# Patient Record
Sex: FEMALE | Race: WHITE | Employment: FULL TIME | URBAN - METROPOLITAN AREA
[De-identification: names, ages, dates, MRNs, and addresses within clinical notes are randomized per-mention and may not be internally consistent; named-entity substitution may affect disease eponyms.]

---

## 2016-10-03 LAB
ANTIBODY SCREEN, EXTERNAL: NEGATIVE
HBSAG, EXTERNAL: NEGATIVE
HIV, EXTERNAL: NEGATIVE
RUBELLA, EXTERNAL: NORMAL
TYPE, ABO & RH, EXTERNAL: NORMAL

## 2017-02-20 LAB — T. PALLIDUM, EXTERNAL: NEGATIVE

## 2017-04-14 LAB — GRBS, EXTERNAL: NEGATIVE

## 2017-04-27 ENCOUNTER — HOSPITAL ENCOUNTER (INPATIENT)
Age: 37
LOS: 2 days | Discharge: HOME | End: 2017-04-29
Attending: OBSTETRICS & GYNECOLOGY | Admitting: OBSTETRICS & GYNECOLOGY
Payer: COMMERCIAL

## 2017-04-27 LAB
BASOPHILS # BLD AUTO: 0 K/UL (ref 0–0.1)
BASOPHILS # BLD: 0 % (ref 0–1)
EOSINOPHIL # BLD: 0 K/UL (ref 0–0.4)
EOSINOPHIL NFR BLD: 0 % (ref 0–7)
ERYTHROCYTE [DISTWIDTH] IN BLOOD BY AUTOMATED COUNT: 14 % (ref 11.5–14.5)
HCT VFR BLD AUTO: 38.9 % (ref 35–47)
HGB BLD-MCNC: 13.4 G/DL (ref 11.5–16)
LYMPHOCYTES # BLD AUTO: 9 % (ref 12–49)
LYMPHOCYTES # BLD: 1.6 K/UL (ref 0.8–3.5)
MCH RBC QN AUTO: 31.5 PG (ref 26–34)
MCHC RBC AUTO-ENTMCNC: 34.4 G/DL (ref 30–36.5)
MCV RBC AUTO: 91.5 FL (ref 80–99)
MONOCYTES # BLD: 1.4 K/UL (ref 0–1)
MONOCYTES NFR BLD AUTO: 8 % (ref 5–13)
NEUTS SEG # BLD: 15.5 K/UL (ref 1.8–8)
NEUTS SEG NFR BLD AUTO: 83 % (ref 32–75)
PLATELET # BLD AUTO: 272 K/UL (ref 150–400)
RBC # BLD AUTO: 4.25 M/UL (ref 3.8–5.2)
WBC # BLD AUTO: 18.5 K/UL (ref 3.6–11)

## 2017-04-27 PROCEDURE — 0HQ9XZZ REPAIR PERINEUM SKIN, EXTERNAL APPROACH: ICD-10-PCS | Performed by: OBSTETRICS & GYNECOLOGY

## 2017-04-27 PROCEDURE — 99282 EMERGENCY DEPT VISIT SF MDM: CPT

## 2017-04-27 PROCEDURE — 85025 COMPLETE CBC W/AUTO DIFF WBC: CPT | Performed by: OBSTETRICS & GYNECOLOGY

## 2017-04-27 PROCEDURE — 77030002888 HC SUT CHRMC J&J -A

## 2017-04-27 PROCEDURE — 74011250637 HC RX REV CODE- 250/637: Performed by: OBSTETRICS & GYNECOLOGY

## 2017-04-27 PROCEDURE — 75410000000 HC DELIVERY VAGINAL/SINGLE

## 2017-04-27 PROCEDURE — 65410000002 HC RM PRIVATE OB

## 2017-04-27 PROCEDURE — 75410000002 HC LABOR FEE PER 1 HR

## 2017-04-27 PROCEDURE — 36415 COLL VENOUS BLD VENIPUNCTURE: CPT | Performed by: OBSTETRICS & GYNECOLOGY

## 2017-04-27 PROCEDURE — 75410000003 HC RECOV DEL/VAG/CSECN EA 0.5 HR

## 2017-04-27 RX ORDER — LANOLIN ALCOHOL/MO/W.PET/CERES
CREAM (GRAM) TOPICAL
COMMUNITY

## 2017-04-27 RX ORDER — ACETAMINOPHEN 325 MG/1
650 TABLET ORAL
Status: DISCONTINUED | OUTPATIENT
Start: 2017-04-27 | End: 2017-04-29 | Stop reason: HOSPADM

## 2017-04-27 RX ORDER — HYDROCORTISONE ACETATE PRAMOXINE HCL 2.5; 1 G/100G; G/100G
CREAM TOPICAL AS NEEDED
Status: DISCONTINUED | OUTPATIENT
Start: 2017-04-27 | End: 2017-04-29 | Stop reason: HOSPADM

## 2017-04-27 RX ORDER — NALOXONE HYDROCHLORIDE 0.4 MG/ML
0.4 INJECTION, SOLUTION INTRAMUSCULAR; INTRAVENOUS; SUBCUTANEOUS AS NEEDED
Status: DISCONTINUED | OUTPATIENT
Start: 2017-04-27 | End: 2017-04-29 | Stop reason: HOSPADM

## 2017-04-27 RX ORDER — IBUPROFEN 400 MG/1
800 TABLET ORAL EVERY 8 HOURS
Status: DISCONTINUED | OUTPATIENT
Start: 2017-04-27 | End: 2017-04-29 | Stop reason: HOSPADM

## 2017-04-27 RX ORDER — ACETAMINOPHEN 325 MG/1
650 TABLET ORAL
COMMUNITY

## 2017-04-27 RX ORDER — NALOXONE HYDROCHLORIDE 0.4 MG/ML
0.4 INJECTION, SOLUTION INTRAMUSCULAR; INTRAVENOUS; SUBCUTANEOUS AS NEEDED
Status: DISCONTINUED | OUTPATIENT
Start: 2017-04-27 | End: 2017-04-27 | Stop reason: HOSPADM

## 2017-04-27 RX ORDER — OXYCODONE AND ACETAMINOPHEN 5; 325 MG/1; MG/1
1 TABLET ORAL
Status: DISCONTINUED | OUTPATIENT
Start: 2017-04-27 | End: 2017-04-29 | Stop reason: HOSPADM

## 2017-04-27 RX ORDER — OXYTOCIN/RINGER'S LACTATE 20/1000 ML
PLASTIC BAG, INJECTION (ML) INTRAVENOUS
Status: DISCONTINUED
Start: 2017-04-27 | End: 2017-04-27

## 2017-04-27 RX ORDER — SODIUM CHLORIDE 0.9 % (FLUSH) 0.9 %
5-10 SYRINGE (ML) INJECTION EVERY 8 HOURS
Status: DISCONTINUED | OUTPATIENT
Start: 2017-04-27 | End: 2017-04-29 | Stop reason: HOSPADM

## 2017-04-27 RX ORDER — SODIUM CHLORIDE 0.9 % (FLUSH) 0.9 %
5-10 SYRINGE (ML) INJECTION AS NEEDED
Status: DISCONTINUED | OUTPATIENT
Start: 2017-04-27 | End: 2017-04-29 | Stop reason: HOSPADM

## 2017-04-27 RX ORDER — SODIUM CHLORIDE, SODIUM LACTATE, POTASSIUM CHLORIDE, CALCIUM CHLORIDE 600; 310; 30; 20 MG/100ML; MG/100ML; MG/100ML; MG/100ML
125 INJECTION, SOLUTION INTRAVENOUS CONTINUOUS
Status: DISCONTINUED | OUTPATIENT
Start: 2017-04-27 | End: 2017-04-29 | Stop reason: HOSPADM

## 2017-04-27 RX ORDER — OXYTOCIN/RINGER'S LACTATE 20/1000 ML
125-500 PLASTIC BAG, INJECTION (ML) INTRAVENOUS ONCE
Status: ACTIVE | OUTPATIENT
Start: 2017-04-27 | End: 2017-04-28

## 2017-04-27 RX ADMIN — IBUPROFEN 800 MG: 400 TABLET, FILM COATED ORAL at 22:17

## 2017-04-27 NOTE — L&D DELIVERY NOTE
Delivery Summary  Patient: Gabriela Doran             Circumcision:   NA-female  Additional Delivery Comments - Patietn arrived to L&D fully dilated with urge to push   Uncomplicated   Vigorous infant at birth   Spontaneous placenta. 1st degree perineal laceration   Local anesthetic given   Repaired anatomically with 2.0 chromic  Tolerated well  Hemostatic post repair   \"Cade\"      Information for the patient's :  Jacinda miller [[id number]]       Labor Events:    Labor: No   Rupture Date: 2017   Rupture Time: 4:30 PM   Rupture Type SROM   Amniotic Fluid Volume:      Amniotic Fluid Description: Clear None   Induction: None       Augmentation: None   Labor Events: None     Cervical Ripening:     None     Delivery Events:  Episiotomy: None   Laceration(s): First degree perineal     Repaired: Yes    Number of Repair Packets: 1   Suture Type and Size: Chromic 2-0     Estimated Blood Loss (ml): 150ml       Delivery Date: 2017    Delivery Time: 6:52 PM  Delivery Type: Vaginal, Spontaneous Delivery  Sex:  Female     Gestational Age: <None>   Delivery Clinician:  Despina Heimlich Miklavcic  Living Status: Yes   Delivery Location: L&D            APGARS  One minute Five minutes Ten minutes   Skin color: 1   1        Heart rate: 2   2        Grimace: 2   2        Muscle tone: 2   2        Breathin   2        Totals: 9   9            Presentation: Vertex    Position:   Occiput Anterior  Resuscitation Method:  Suctioning-bulb; Tactile Stimulation     Meconium Stained: None      Cord Vessels: 3 Vessels      Cord Events:    Cord Blood Sent?:  No    Blood Gases Sent?:  No    Placenta:  Date/Time:   7:05 PM  Removal: Spontaneous      Appearance: Intact      Measurements:  Birth Weight: 3.475 kg      Birth Length: 49.5 cm      Head Circumference: 33.5 cm      Chest Circumference: 35 cm     Abdominal Girth: 32.5 cm    Other Providers:   SAGAR NG;BARI JOHNSTON;CARMEN BUSBY Andres Reyes, Obstetrician;Primary Nurse;Primary  Nurse;Nursery Nurse;Scrub Tech           Cord pH:  none    Episiotomy: None   Laceration(s): First degree perineal      Estimated Blood Loss (ml): 150    Labor Events  Method: None       Augmentation: None   Cervical Ripening:       None        Operative Vaginal Delivery - none    Group B Strep: No results found for: GRBSEXT, GRBSEXT  Information for the patient's :  Min miller [[id number]]   No results found for: ABORH, PCTABR, PCTDIG, BILI, ABORHEXT, ABORH    No results found for: APH, APCO2, APO2, AHCO3, ABEC, ABDC, O2ST, EPHV, PCO2V, PO2V, HCO3V, EBEV, EBDV, SITE, RSCOM

## 2017-04-27 NOTE — IP AVS SNAPSHOT
Current Discharge Medication List  
  
CONTINUE these medications which have NOT CHANGED Dose & Instructions Dispensing Information Comments Morning Noon Evening Bedtime Iron 325 mg (65 mg iron) tablet Generic drug:  ferrous sulfate Your last dose was: Your next dose is: Take  by mouth Daily (before breakfast). Refills:  0  
     
   
   
   
  
 PNV Combo No.47-Iron-FA #1-DHA 27-1-300 mg Cap Your last dose was: Your next dose is: Take  by mouth. Refills:  0  
     
   
   
   
  
 TYLENOL 325 mg tablet Generic drug:  acetaminophen Your last dose was: Your next dose is:    
   
   
 Dose:  650 mg Take 650 mg by mouth every four (4) hours as needed for Pain. Refills:  0

## 2017-04-27 NOTE — PROGRESS NOTES
: Pt arrived ambulatory to unit c/o contractions since 11am, pt was seen in office today at 1500 was 1cm, pt states some fluid leaking out today can't recall what time.  Pt appears very uncomfortable, performed SVE pt 10 cm, called Dr. Kaitlin Chris to bedside for delivery    : Dr. Kaitlin Chris at bedside, arrived for delivery    :  of live female infant

## 2017-04-27 NOTE — IP AVS SNAPSHOT
Summary of Care Report The Summary of Care report has been created to help improve care coordination. Users with access to [employer] or [employer] [facility] (Web-based application) may access additional patient information including the Discharge Summary. If you are not currently a [address] user and need more information, please call the number listed below in the [place] [address] section and ask to be connected with Medical Records. Facility Information Name Address Phone [address] [address] [phone] Patient Information Patient Name Sex MIRTHA Darling ([id number]) Female 1980 Discharge Information Admitting Provider Service Area Unit Erika Solis MD / [phone] [address] Mother Infant / [phone] Discharge Provider Discharge Date/Time Discharge Disposition Destination (none) 2017 (Pending) AHR (none) Patient Language Language ENGLISH [13] Hospital Problems as of 2017  Never Reviewed Class Noted - Resolved Last Modified POA Active Problems Labor and delivery, indication for care  2017 - Present 2017 by Erika Solis MD Unknown Entered by Erika Solis MD  
  
You are allergic to the following No active allergies Current Discharge Medication List  
  
CONTINUE these medications which have NOT CHANGED Dose & Instructions Dispensing Information Comments Iron 325 mg (65 mg iron) tablet Generic drug:  ferrous sulfate Take  by mouth Daily (before breakfast). Refills:  0  
   
 PNV Combo No.47-Iron-FA #1-DHA 27-1-300 mg Cap Take  by mouth. Refills:  0  
   
 TYLENOL 325 mg tablet Generic drug:  acetaminophen Dose:  650 mg Take 650 mg by mouth every four (4) hours as needed for Pain. Refills:  0 Follow-up Information Follow up With Details Comments Contact Info Provider Unknown   Patient not available to ask Ty Loera MD In 6 weeks  [address] [facility] 
[phone] Discharge Instructions POST DELIVERY DISCHARGE INSTRUCTIONS Name: Stella Sommers YOB: 1980 Primary Diagnosis: Active Problems: 
  Labor and delivery, indication for care (4/27/2017) General:  
 
Diet/Diet Restrictions: 
· Eight 8-ounce glasses of fluid daily (water, juices); avoid excessive caffeine intake. · Meals/snacks as desired which are high in fiber and carbohydrates and low in fat and cholesterol. Medications:  
{Medication reconciliation information is now added to the patient's AVS automatically when it is printed. There is no need to use this SmartLink in discharge instructions. Highlight this text and delete it to clear this message} Physical Activity / Restrictions / Safety: · Avoid heavy lifting, no more that 8 lbs. For 2-3 weeks;  
· Limit use of stairs to 2 times daily for the first week home. · No driving for one week. · Avoid intercourse 4-6 weeks, no douching or tampon use. · Check with obstetrician before starting or resuming an exercise program.   
 
Discharge Instructions/Special Treatment/Home Care Needs:  
 
· Continue prenatal vitamins. · Continue to use squirt bottle with warm water on your episiotomy after each bathroom use until bleeding stops. · If steri-strips applied to your incision, remove in 7-10 days. Call your doctor for the following: · Fever over 101 degrees by mouth. · Vaginal bleeding heavier than a normal menstrual period or clots larger than a golf ball. · Red streaks or increased swelling of legs, painful red streaks on your breast. 
· Painful urination, constipation and increased pain or swelling or discharge with your incision. · If you feel extremely anxious or overwhelmed. · If you have thoughts of harming yourself and/or your baby. Pain Management:  
 
· Take Acetaminophen (Tylenol) or Ibuprofen (Advil, Motrin), as directed for pain. · Use a warm Sitz bath 3 times daily to relieve episiotomy or hemorrhoidal discomfort. · For hemorrhoidal discomfort, use Tucks and Anusol cream as needed and directed. · Heating pad to  incision as needed. Follow-Up Care:  
 
Appointment with MD: Follow-up Appointments Procedures  FOLLOW UP VISIT Appointment in: 6 Weeks Standing Status:   Standing Number of Occurrences:   1 Order Specific Question:   Appointment in Answer:   6 Weeks Telephone number: [phone] Signed By: Kirsten Arboleda MD                                                                                                   Date: 2017 Time: 8:51 AM 
 
 
 
Datto Activation Thank you for enrolling in [facility]. Please follow the instructions below to securely access your online medical record. Datto allows you to send messages to your doctor, view your test results, renew your prescriptions, schedule appointments, and more. How Do I Sign Up? 1. In your internet browser, go to [email or web address]. 2. Click on the First Time User? Click Here link in the Sign In box. You will see the New Member Sign Up page. 3. Enter your Datto Access Code exactly as it appears below. You will not need to use this code after youve completed the sign-up process. If you do not sign up before the expiration date, you must request a new code. Datto Access Code: [email or web address] Expires: 2017  5:51 PM  
 
4. Enter the last four digits of your Social Security Number (xxxx) and Date of Birth (mm/dd/yyyy) as indicated and click Submit. You will be taken to the next sign-up page. 5. Create a Datto ID.  This will be your Datto login ID and cannot be changed, so think of one that is secure and easy to remember. 6. Create a Greystripe password. You can change your password at any time. 7. Enter your Password Reset Question and Answer. This can be used at a later time if you forget your password. 8. Enter your e-mail address. You will receive e-mail notification when new information is available in [address] 9. Click Sign Up. You can now view your medical record. Additional Information Remember, Greystripe is NOT to be used for urgent needs. For medical emergencies, dial 911. Now available from your iPhone and Android! Chart Review Routing History Recipient Method Report Sent By Jasper Muro Provider Unknown, MD  
Patient not available to ask [address] Mail IP Auto Routed Notes MD Emir Blackwell 4/29/2017  8:51 AM 04/29/2017

## 2017-04-27 NOTE — IP AVS SNAPSHOT
[address] [facility]. 
[phone] Patient: Teresa Meyers MRN: [id number] GES:[birth year] You are allergic to the following No active allergies Recent Documentation Breastfeeding? OB Status Unknown Recent pregnancy Unresulted Labs Order Current Status SAMPLE TO BLOOD BANK In process Emergency Contacts Name Discharge Info Relation Home Work Mobile Samy Jackson  Spouse [3] [phone] About your hospitalization You were admitted on:  April 27, 2017 You last received care in the:  [facility] 3 MOTHER INFANT You were discharged on:  April 29, 2017 Unit phone number:  [phone] Why you were hospitalized Your primary diagnosis was:  Not on File Your diagnoses also included:  Labor And Delivery, Indication For Care Providers Seen During Your Hospitalizations Provider Role Specialty Primary office phone Jeremiah Zapata MD Attending Provider Obstetrics & Gynecology [phone] Your Primary Care Physician (PCP) Primary Care Physician Office Phone Office Fax UNKNOWN, PROVIDER ** None ** ** None ** Follow-up Information Follow up With Details Comments Contact Info Provider Unknown   Patient not available to ask Jeremiah Zapata MD In 6 weeks  [address] [facility]. [phone] Current Discharge Medication List  
  
CONTINUE these medications which have NOT CHANGED Dose & Instructions Dispensing Information Comments Morning Noon Evening Bedtime Iron 325 mg (65 mg iron) tablet Generic drug:  ferrous sulfate Your last dose was: Your next dose is: Take  by mouth Daily (before breakfast). Refills:  0  
     
   
   
   
  
 PNV Combo No.47-Iron-FA #1-DHA 27-1-300 mg Cap Your last dose was: Your next dose is: Take  by mouth. Refills:  0  
     
   
   
   
  
 TYLENOL 325 mg tablet Generic drug:  acetaminophen Your last dose was: Your next dose is:    
   
   
 Dose:  650 mg Take 650 mg by mouth every four (4) hours as needed for Pain. Refills:  0 Discharge Instructions POST DELIVERY DISCHARGE INSTRUCTIONS Name: Sharmin Briones YOB: 1980 Primary Diagnosis: Active Problems: 
  Labor and delivery, indication for care (4/27/2017) General:  
 
Diet/Diet Restrictions: 
· Eight 8-ounce glasses of fluid daily (water, juices); avoid excessive caffeine intake. · Meals/snacks as desired which are high in fiber and carbohydrates and low in fat and cholesterol. Medications:  
{Medication reconciliation information is now added to the patient's AVS automatically when it is printed. There is no need to use this SmartLink in discharge instructions. Highlight this text and delete it to clear this message} Physical Activity / Restrictions / Safety: · Avoid heavy lifting, no more that 8 lbs. For 2-3 weeks;  
· Limit use of stairs to 2 times daily for the first week home. · No driving for one week. · Avoid intercourse 4-6 weeks, no douching or tampon use. · Check with obstetrician before starting or resuming an exercise program.   
 
Discharge Instructions/Special Treatment/Home Care Needs:  
 
· Continue prenatal vitamins. · Continue to use squirt bottle with warm water on your episiotomy after each bathroom use until bleeding stops. · If steri-strips applied to your incision, remove in 7-10 days. Call your doctor for the following: · Fever over 101 degrees by mouth. · Vaginal bleeding heavier than a normal menstrual period or clots larger than a golf ball.  
· Red streaks or increased swelling of legs, painful red streaks on your breast. 
 · Painful urination, constipation and increased pain or swelling or discharge with your incision. · If you feel extremely anxious or overwhelmed. · If you have thoughts of harming yourself and/or your baby. Pain Management:  
 
· Take Acetaminophen (Tylenol) or Ibuprofen (Advil, Motrin), as directed for pain. · Use a warm Sitz bath 3 times daily to relieve episiotomy or hemorrhoidal discomfort. · For hemorrhoidal discomfort, use Tucks and Anusol cream as needed and directed. · Heating pad to  incision as needed. Follow-Up Care:  
 
Appointment with MD: Follow-up Appointments Procedures  FOLLOW UP VISIT Appointment in: 6 Weeks Standing Status:   Standing Number of Occurrences:   1 Order Specific Question:   Appointment in Answer:   6 Weeks Telephone number: [phone] Signed By: Jarad Valdivia MD                                                                                                   Date: 2017 Time: 8:51 AM 
 
 
 
Lelonghart Activation Thank you for enrolling in [address] Please follow the instructions below to securely access your online medical record. [facility] allows you to send messages to your doctor, view your test results, renew your prescriptions, schedule appointments, and more. How Do I Sign Up? 1. In your internet browser, go to [email or web address]. 2. Click on the First Time User? Click Here link in the Sign In box. You will see the New Member Sign Up page. 3. Enter your [facility] Access Code exactly as it appears below. You will not need to use this code after youve completed the sign-up process. If you do not sign up before the expiration date, you must request a new code. [facility] Access Code: [email or web address] Expires: 2017  5:51 PM  
 
4. Enter the last four digits of your Social Security Number (xxxx) and Date of Birth (mm/dd/yyyy) as indicated and click Submit.  You will be taken to the next sign-up page. 5. Create a Pain Doctor ID. This will be your Pain Doctor login ID and cannot be changed, so think of one that is secure and easy to remember. 6. Create a [facility] Doctor password. You can change your password at any time. 7. Enter your Password Reset Question and Answer. This can be used at a later time if you forget your password. 8. Enter your e-mail address. You will receive e-mail notification when new information is available in [address] 9. Click Sign Up. You can now view your medical record. Additional Information Remember, Pain Doctor is NOT to be used for urgent needs. For medical emergencies, dial 911. Now available from your iPhone and Android! Discharge Orders None Pain Doctor Announcement We are excited to announce that we are making your provider's discharge notes available to you in Pain Doctor. You will see these notes when they are completed and signed by the physician that discharged you from your recent hospital stay. If you have any questions or concerns about any information you see in Pain Doctor, please call the Health Information Department where you were seen or reach out to your Primary Care Provider for more information about your plan of care. Introducing [facility]! Crista Mendes introduces Pain Doctor patient portal. Now you can access parts of your medical record, email your doctor's office, and request medication refills online. 1. In your internet browser, go to [email or web address] 2. Click on the First Time User? Click Here link in the Sign In box. You will see the New Member Sign Up page. 3. Enter your [facility] Doctor Access Code exactly as it appears below. You will not need to use this code after youve completed the sign-up process. If you do not sign up before the expiration date, you must request a new code. · [facility] Access Code: [email or web address] Expires: 7/26/2017  5:51 PM 
 
 4. Enter the last four digits of your Social Security Number (xxxx) and Date of Birth (mm/dd/yyyy) as indicated and click Submit. You will be taken to the next sign-up page. 5. Create a MyVR ID. This will be your MyVR login ID and cannot be changed, so think of one that is secure and easy to remember. 6. Create a MyVR password. You can change your password at any time. 7. Enter your Password Reset Question and Answer. This can be used at a later time if you forget your password. 8. Enter your e-mail address. You will receive e-mail notification when new information is available in [address] 9. Click Sign Up. You can now view and download portions of your medical record. 10. Click the Download Summary menu link to download a portable copy of your medical information. If you have questions, please visit the Frequently Asked Questions section of the MyVR website. Remember, MyVR is NOT to be used for urgent needs. For medical emergencies, dial 911. Now available from your iPhone and Android! General Information Please provide this summary of care documentation to your next provider. Patient Signature:  ____________________________________________________________ Date:  ____________________________________________________________  
  
Nancy Brought Provider Signature:  ____________________________________________________________ Date:  ____________________________________________________________

## 2017-04-28 PROCEDURE — 65410000002 HC RM PRIVATE OB

## 2017-04-28 PROCEDURE — 74011250637 HC RX REV CODE- 250/637: Performed by: OBSTETRICS & GYNECOLOGY

## 2017-04-28 RX ADMIN — IBUPROFEN 800 MG: 400 TABLET, FILM COATED ORAL at 14:32

## 2017-04-28 RX ADMIN — IBUPROFEN 800 MG: 400 TABLET, FILM COATED ORAL at 05:59

## 2017-04-28 RX ADMIN — IBUPROFEN 800 MG: 400 TABLET, FILM COATED ORAL at 22:04

## 2017-04-28 NOTE — H&P
EDC:2017  EGA: 38 weeks, 3 days      Primary Provider:  Noe Logan MD    CC:  labor. History of Present Illness:  present sin active labor, fully dilated with urge to push   Precip delivery on arrival   GBS neg   See delivery note for full details       Patient's Prenatal Care with Doctor of Record Noe Logan MD Notable For -    Back pain in pregnancy  Advanced paternal age   lab screening  High risk pregnancy AMA primigravida          Impression & Recommendations:    Problem # 1:  High risk pregnancy AMA primigravida (ICD-V23.81) (FAQ96-B84.513)  admit in active labor   precip delivery on arrival   Uncomplicated    see delivery note for full details         Past Pregnancy History      :  1     Term Births:  0     Premature Births: 0     Living Children: 0     Para:   0     Mult. Births:  0     Prev : 0     Aborta:  0     Elect. Ab:  0     Spont. Ab:  0     Ectopics:  0    Pregnancy # 1     Comments:  current        Past Medical History:     Reviewed history from 2016 and no changes required:        labile hypertension- never taken medication        ?  outbreak of HSV in college- neg HSV 2 Abs 2016        Abnormal Pap Smear s/p cryo     Past Surgical History:     Reviewed history from 2016 and no changes required:        wisdom tooth removed    Family History Summary:      Reviewed history Last on 2017 and no changes required:2017  Father (Elsa Patel) - Has Family History of Hypertension - Entered On: 2016  Mother Darleen Barrios.) - Has Family History of Hypertension - Entered On: 2016  Aunt - Has Family History of Heart Disease - Heart Attack;  - Entered On: 2016  Other family member - Has Family History of Breast Cancer - MGA - Entered On: 2016    General Comments - FH:  Family history transferred to [address] [place] [address] And [address] [facility]     Social History:     Reviewed history from 10/03/2016 and no changes required:               Previous Tobacco Use: Signed On - 11/03/2016  Smoked Tobacco Use:  Never smoker  Smokeless Tobacco Use:  Never  Passive smoke exposure:  no  Drug use:  no  HIV high-risk behavior:  no  Caffeine use:  1 drinks per day    Previous Alcohol Use: Signed On - 11/03/2016  Alcohol use:  no  Exercise:  yes     Times per week:  3-6     Type of Exercise:  runj, weights  Seatbelt use:  preg- %    PAP Smear History:     Date of Last PAP Smear:  10/03/2016      Review of Systems        See HPI    Except as noted in the HPI, the review of systems is negative for General and .     Allergies      Medications Removed from Medication List        Flowsheet View for Follow-up Visit     Estimated weeks of        gestation:  38 3/7     FHR:   135     Fetal position:  vertex     Cx Dilation:  10cm     Cx Effacement: 100%     Cx Station:  +2      Vital Signs      FHT Descrip:    reactive       - Additional FHT Comments: category 1 fetal tracing   Contractions:  yes  UC Frequency:  q 3 min    NST:   reactive         Physical Exam     General           General appearance:  uncomfrotable with contractions     Head           Inspection:   normal    Eyes           External:   EOM intact    ENT           Dental:   adequate dentition    Chest           Lungs:  clear to auscultation          Heart:  regular rate and rhythm    Extremeties           Extremeties:  0 edema    Neurological           Reflexes:  2+ and symmetric with no pathological reflexes    Psych           Orientation:  oriented to time, place, and person          Mood:  no appearance of anxiety, depression, or agitation    Lymph           Inguinal:  no inguinal adenopathy    Skin           Inspection:  no rashes, suspicious lesions, or ulcerations    Abdomen           Abdomen:  gravid    Pelvic Exam           EGBUS:  no lesions          Vagina:  normal appearing without lesions or discharge          Uterus:  gravid          Cervix:  no lesions or discharge                  Dilation: : 10cm                  Effacement:  100%                  Station:  +2                  Presentation:  vertex                  Membranes:  ruptured, clear            Impression & Recommendations:    Problem # 1:  High risk pregnancy AMA primigravida (ICD-V23.81) (UHF74-Z46.513)  admit in active labor   precip delivery on arrival   Uncomplicated    see delivery note for full details         Medications (at conclusion of this visit)    2017 IRON CR-TABS (FERROUS SULFATE CR-TABS) Prescribed by non 606/706 Silvia Bejarano MD  2017 BREAST PUMP MISC (MISC. DEVICES) double electric breat pump diagnosis normal breast feeding  10/03/2016 PRENATAL VITAMIN TABS (PRENATAL VIT-FE FUMARATE-FA TABS)           LABORATORY DATA   TEST DATE RESULT   Group B Strep culture 2017 Negative                                   (Group B Strep Culture Result Field)   Blood Type 10/03/2016 O                                             (Blood Type Result Field)   Rh 10/03/2016 Positive                                   (Rh Result Field)   Rhogam Inj Given     Tdap Vaccine Given 2017 Vacc.  [address]   Antibody Screen 2017 Negative   Rubella  [facility] Reference Ranges On or After 3/10/14                  <0.90              Non-immune      0.90 - 0.99     Equivocal      >0.99              Immune    [facility] Reference Ranges  Before 3/10/14           <5                 Non-immune             5 - 9               Equivocal            >9                 Immune  [facility] Reference Ranges       < Or = 0.90       Negative             0.91-1.09          Equivocal            > Or = 1.10       Positive   10/03/2016     2.03     TPA (T Pallidum Antibodies) 2017 Negative   Serology (RPR)     HBsAg 10/03/2016 Negative   HIV 10/03/2016 Non Reactive   Hemoglobin 2017 11.0   Hematocrit 2017 33.1   Platelets  303 X10E3/UL   TSH     Urine Culture 10/03/2016 Negative   GC DNA Probe 10/03/2016 Negative Chlamydia DNA 10/03/2016 Negative   PAP 10/03/2016 NIL   Flu Vaccine Given 10/03/2016 accepted   HGBA1C     HGB Electro     T4, Free     BG Fasting     GTT 1H 50G 02/20/2017 99   GTT 1H 100G     GTT 2H 100G     GTT 3H 100G     Glucose Plasma     CF Accept or Decline 12/22/2016 declined   CF Screen Result 12/22/2016 Declined   Nuchal Trans 12/22/2016 4.67^4. 67 mm&millimeters   AFP Only 12/01/2016 *Screen Negative*   Tetra     AFP Serum     CVS 10/03/2016 declined   AFP Amniotic     Amnio Karyo 10/03/2016 declined   FISH     GC Culture     Chlamydia Cult     Ureaplasma     Mycoplasma     WBC 10/03/2016 7.1 X10E3/UL   RBC 10/03/2016 4.06 X10E6/UL   MCV 10/03/2016 91   MCH 10/03/2016 31.0   MCHC RBC 10/03/2016 34.2     ULTRASOUND DATA   TEST DATE RESULT   Estimated Fetal Weight 02/20/2017 5430^1731 g&grams                                     Weight % 02/20/2017 94^94% %&percent                                                HELLEN 02/20/2017 37.23^18.2 cm&centimeters                    BPP 02/20/2017 8^8 [n/a]&Not applicable   Cervical Length (mm)             Electronically signed by Rojas Feldman MD on 04/27/2017 at 8:48 PM    ________________________________________________________________________

## 2017-04-28 NOTE — ROUTINE PROCESS
Bedside and Verbal shift change report given to ZA Sandoval RN (oncoming nurse) by JENNIFER Gilman RN (offgoing nurse). Report included the following information SBAR.

## 2017-04-28 NOTE — PROGRESS NOTES
Post-Partum Day Number 1 Progress Note    Baljitvenus Manuel     Assessment: Doing well, post partum day 1    Plan:  1. Continue routine postpartum and perineal care as well as maternal education. 2. N/A     Information for the patient's :  Martita miller [[id number]]   Vaginal, Spontaneous Delivery   Patient doing well without significant complaint. Voiding without difficulty, normal lochia. Vitals:  Visit Vitals    /86 (BP 1 Location: Right arm, BP Patient Position: Sitting)    Pulse 70    Temp 98.7 °F (37.1 °C)    Resp 16    SpO2 97%    Breastfeeding Unknown     Temp (24hrs), Av.5 °F (36.9 °C), Min:98.1 °F (36.7 °C), Max:98.7 °F (37.1 °C)        Exam:   Patient without distress. Abdomen soft, fundus firm, nontender                Perineum with normal lochia noted. Lower extremities are negative for swelling, cords or tenderness. Labs:     Lab Results   Component Value Date/Time    WBC 18.5 2017 06:44 PM    HGB 13.4 2017 06:44 PM    HCT 38.9 2017 06:44 PM    PLATELET 092  06:44 PM       Recent Results (from the past 24 hour(s))   CBC WITH AUTOMATED DIFF    Collection Time: 17  6:44 PM   Result Value Ref Range    WBC 18.5 (H) 3.6 - 11.0 K/uL    RBC 4.25 3.80 - 5.20 M/uL    HGB 13.4 11.5 - 16.0 g/dL    HCT 38.9 35.0 - 47.0 %    MCV 91.5 80.0 - 99.0 FL    MCH 31.5 26.0 - 34.0 PG    MCHC 34.4 30.0 - 36.5 g/dL    RDW 14.0 11.5 - 14.5 %    PLATELET 432 466 - 137 K/uL    NEUTROPHILS 83 (H) 32 - 75 %    LYMPHOCYTES 9 (L) 12 - 49 %    MONOCYTES 8 5 - 13 %    EOSINOPHILS 0 0 - 7 %    BASOPHILS 0 0 - 1 %    ABS. NEUTROPHILS 15.5 (H) 1.8 - 8.0 K/UL    ABS. LYMPHOCYTES 1.6 0.8 - 3.5 K/UL    ABS. MONOCYTES 1.4 (H) 0.0 - 1.0 K/UL    ABS. EOSINOPHILS 0.0 0.0 - 0.4 K/UL    ABS.  BASOPHILS 0.0 0.0 - 0.1 K/UL

## 2017-04-28 NOTE — LACTATION NOTE
This note was copied from a baby's chart. 1923 [facility] services introduced to new family. 10 hours of life, -1% wt loss. 4 baby led feeds, latch of 8 recorded. 3 wet, 1 stool   Well read and independent mother following baby led cues. Just fed infant x 20 minutes, skin to skin. Infant is relaxed and sleeping. Bedside I/0 on track, diligent parents observations and enjoying new baby. Has pump provided by insurance benefit. AWP and support group explained for 1st time mother. Call prn.

## 2017-04-28 NOTE — PROGRESS NOTES
Pt OOB for 2nd time. Voided in potty hat for second time without difficulty. Mary care performed. IV removed.

## 2017-04-28 NOTE — PROGRESS NOTES
Bedside shift change report given to JOSE GUADALUPE Bustamante RN (oncoming nurse) by ZA Ca RN. (offgoing nurse). Report included the following information SBAR.

## 2017-04-28 NOTE — ROUTINE PROCESS
Bedside and Verbal shift change report given to BERNICE Marvin (oncoming nurse) by Uzma Nj. America Hawkins RN (offgoing nurse). Report included the following information SBAR, Kardex, Procedure Summary, Intake/Output, MAR and Recent Results.

## 2017-04-28 NOTE — ROUTINE PROCESS
TRANSFER - IN REPORT:    Verbal report received from KAMILLE Sloan RN(name) on Teresa Meyers  being received from L&D(unit) for routine progression of care      Report consisted of patients Situation, Background, Assessment and   Recommendations(SBAR). Information from the following report(s) SBAR was reviewed with the receiving nurse. Opportunity for questions and clarification was provided. Assessment completed upon patients arrival to unit and care assumed.

## 2017-04-29 VITALS
DIASTOLIC BLOOD PRESSURE: 90 MMHG | OXYGEN SATURATION: 97 % | TEMPERATURE: 98.6 F | SYSTOLIC BLOOD PRESSURE: 129 MMHG | RESPIRATION RATE: 18 BRPM | HEART RATE: 65 BPM

## 2017-04-29 PROCEDURE — 74011250637 HC RX REV CODE- 250/637: Performed by: OBSTETRICS & GYNECOLOGY

## 2017-04-29 RX ADMIN — IBUPROFEN 800 MG: 400 TABLET, FILM COATED ORAL at 04:57

## 2017-04-29 NOTE — DISCHARGE INSTRUCTIONS
POST DELIVERY DISCHARGE INSTRUCTIONS    Name: Bobbi Morales  YOB: 1980  Primary Diagnosis: Active Problems:    Labor and delivery, indication for care (4/27/2017)        General:     Diet/Diet Restrictions:  · Eight 8-ounce glasses of fluid daily (water, juices); avoid excessive caffeine intake. · Meals/snacks as desired which are high in fiber and carbohydrates and low in fat and cholesterol. Medications:   {Medication reconciliation information is now added to the patient's AVS automatically when it is printed. There is no need to use this SmartLink in discharge instructions. Highlight this text and delete it to clear this message}      Physical Activity / Restrictions / Safety:     · Avoid heavy lifting, no more that 8 lbs. For 2-3 weeks;   · Limit use of stairs to 2 times daily for the first week home. · No driving for one week. · Avoid intercourse 4-6 weeks, no douching or tampon use. · Check with obstetrician before starting or resuming an exercise program.      Discharge Instructions/Special Treatment/Home Care Needs:     · Continue prenatal vitamins. · Continue to use squirt bottle with warm water on your episiotomy after each bathroom use until bleeding stops. · If steri-strips applied to your incision, remove in 7-10 days. Call your doctor for the following:     · Fever over 101 degrees by mouth. · Vaginal bleeding heavier than a normal menstrual period or clots larger than a golf ball. · Red streaks or increased swelling of legs, painful red streaks on your breast.  · Painful urination, constipation and increased pain or swelling or discharge with your incision. · If you feel extremely anxious or overwhelmed. · If you have thoughts of harming yourself and/or your baby. Pain Management:     · Take Acetaminophen (Tylenol) or Ibuprofen (Advil, Motrin), as directed for pain. · Use a warm Sitz bath 3 times daily to relieve episiotomy or hemorrhoidal discomfort.    · For hemorrhoidal discomfort, use Tucks and Anusol cream as needed and directed. · Heating pad to  incision as needed. Follow-Up Care:     Appointment with MD:   Follow-up Appointments   Procedures    FOLLOW UP VISIT Appointment in: 6 Weeks     Standing Status:   Standing     Number of Occurrences:   1     Order Specific Question:   Appointment in     Answer:   Katarina Velazco     Telephone number: [phone]    Signed By: Ross Blandon MD                                                                                                   Date: 2017 Time: 8:51 AM        MyChart Activation    Thank you for enrolling in [facility]. Please follow the instructions below to securely access your online medical record. [employer] allows you to send messages to your doctor, view your test results, renew your prescriptions, schedule appointments, and more. How Do I Sign Up? 1. In your internet browser, go to [email or web address]. 2. Click on the First Time User? Click Here link in the Sign In box. You will see the New Member Sign Up page. 3. Enter your [employer] Access Code exactly as it appears below. You will not need to use this code after youve completed the sign-up process. If you do not sign up before the expiration date, you must request a new code. [employer] Access Code: [email or web address]  Expires: 2017  5:51 PM     4. Enter the last four digits of your Social Security Number (xxxx) and Date of Birth (mm/dd/yyyy) as indicated and click Submit. You will be taken to the next sign-up page. 5. Create a [employer] ID. This will be your [employer] login ID and cannot be changed, so think of one that is secure and easy to remember. 6. Create a [employer] password. You can change your password at any time. 7. Enter your Password Reset Question and Answer. This can be used at a later time if you forget your password. 8. Enter your e-mail address.  You will receive e-mail notification when new information is available in [employer]. 9. Click Sign Up. You can now view your medical record. Additional Information    Remember, [employer] is NOT to be used for urgent needs. For medical emergencies, dial 911. Now available from your iPhone and Android!

## 2017-04-29 NOTE — PROGRESS NOTES
Bedside and Verbal shift change report given to JCARLOS Tyson RN  (oncoming nurse) by BERNICE Cameron  (offgoing nurse). Report included the following information SBAR, Kardex, Procedure Summary, Intake/Output, MAR and Recent Results.

## 2017-04-29 NOTE — PROGRESS NOTES
Discharge paperwork completed and given to patient. Reviewed with patient and SO. All questions answered and patient and SO verbalize understanding.

## 2017-04-29 NOTE — PROGRESS NOTES
Post-Partum Day Number 2 Progress Note    Vianey Jackson     Assessment: Doing well, post partum day 2    Plan:   1. Discharge home today  2. Follow up in office in 6 weeks with Kenny Zendejas MD  3. Post partum activity advised, diet as tolerated  4. Discharge Medications: ibuprofen, percocet and medications prior to admission    Information for the patient's :  Leigha miller [[id number]]   Vaginal, Spontaneous Delivery   Patient doing well without significant complaint. Voiding without difficulty, normal lochia. Vitals:  Visit Vitals    /90 (BP 1 Location: Right arm, BP Patient Position: At rest)    Pulse 65    Temp 98.6 °F (37 °C)    Resp 18    SpO2 97%    Breastfeeding Unknown     Temp (24hrs), Av.1 °F (36.7 °C), Min:97.5 °F (36.4 °C), Max:98.6 °F (37 °C)      Exam:         Patient without distress. Abdomen soft, fundus firm, nontender                 Lower extremities are negative for swelling, cords or tenderness. Labs:     Lab Results   Component Value Date/Time    WBC 18.5 2017 06:44 PM    HGB 13.4 2017 06:44 PM    HCT 38.9 2017 06:44 PM    PLATELET 937  06:44 PM       No results found for this or any previous visit (from the past 24 hour(s)).

## 2017-04-29 NOTE — DISCHARGE SUMMARY
Obstetrical Discharge Summary     Name: Rosalio Stephens MRN: [id number]  SSN: [id number]    YOB: 1980  Age: 39 y.o. Sex: female      Admit Date: 2017    Discharge Date: 2017     Admitting Physician: Dylon Singh MD     Attending Physician:  Ortiz Gresham MD     Admission Diagnoses: pam/vaginal bleeding  pam/vaginal bleeding  Labor and delivery, indication for care    Discharge Diagnoses:   Information for the patient's :  Cody miller [[id number]]   Delivery of a 3.475 kg female infant via Vaginal, Spontaneous Delivery on 2017 at 6:52 PM  by . Apgars were 9 and 9. Additional Diagnoses:   Hospital Problems  Never Reviewed          Codes Class Noted POA    Labor and delivery, indication for care ICD-10-CM: O75.9  ICD-9-CM: 659.90  2017 Unknown             Lab Results   Component Value Date/Time    Rubella, External Immune 10/03/2016    GrBStrep, External Negative 2017       Hospital Course: Normal hospital course following the delivery. Disposition at Discharge: Home or self care    Discharged Condition: Stable    Patient Instructions:   Current Discharge Medication List      CONTINUE these medications which have NOT CHANGED    Details   ferrous sulfate (IRON) 325 mg (65 mg iron) tablet Take  by mouth Daily (before breakfast). acetaminophen (TYLENOL) 325 mg tablet Take 650 mg by mouth every four (4) hours as needed for Pain. PNV Combo No.47-Iron-FA #1-DHA 27-1-300 mg cap Take  by mouth. Reference my discharge instructions.     Follow-up Appointments   Procedures    FOLLOW UP VISIT Appointment in: 6 Weeks     Standing Status:   Standing     Number of Occurrences:   1     Order Specific Question:   Appointment in     Answer:   6 Weeks        Signed By:  Jarad Valdivia MD     2017